# Patient Record
Sex: FEMALE | Race: WHITE | Employment: FULL TIME | URBAN - METROPOLITAN AREA
[De-identification: names, ages, dates, MRNs, and addresses within clinical notes are randomized per-mention and may not be internally consistent; named-entity substitution may affect disease eponyms.]

---

## 2017-03-02 NOTE — TELEPHONE ENCOUNTER
From: Flori Hyatt  To:  Shin Lujan MD  Sent: 3/2/2017 12:46 PM CST  Subject: Medication Renewal Request    Original authorizing provider: MD Flori Ware would like a refill of the following medications:  Nitrofurantoin Macrocrystal

## 2017-03-02 NOTE — TELEPHONE ENCOUNTER
Dr. Yoan Taylor, Pt [facility] 3/7/16 and pt does not have upcoming sandie. Pt contacted us through my chart for a refill on nitrofuratoin 50mg if you agree please review and sign med thank you. I copied and pasted part of your note below. ..     Reviewed findings with behzad

## 2017-03-03 RX ORDER — NITROFURANTOIN MACROCRYSTALS 50 MG/1
50 CAPSULE ORAL NIGHTLY
Qty: 30 CAPSULE | Refills: 11 | Status: SHIPPED | OUTPATIENT
Start: 2017-03-03 | End: 2018-05-23

## 2017-05-24 DIAGNOSIS — Z30.41 ORAL CONTRACEPTIVE USE: ICD-10-CM

## 2017-05-26 DIAGNOSIS — Z30.41 ORAL CONTRACEPTIVE USE: ICD-10-CM

## 2017-05-29 RX ORDER — NORGESTIMATE AND ETHINYL ESTRADIOL 7DAYSX3 28
KIT ORAL
Qty: 1 PACKAGE | Refills: 1 | Status: SHIPPED | OUTPATIENT
Start: 2017-05-29 | End: 2017-07-27

## 2017-05-29 NOTE — TELEPHONE ENCOUNTER
Gynecology Medications  Protocol Criteria:  · Appointment scheduled in the past 12 months or the next 3 months  · Pap smear in the past 12 months  · Pap smear WNL manually verified  Recent Visits       Provider Department Primary Dx    11 months ago [employer]

## 2017-05-29 NOTE — TELEPHONE ENCOUNTER
From: Ajay Mix  To: Darren Jackson MD  Sent: 5/24/2017 12:35 PM CDT  Subject: Medication Renewal Request    Original authorizing provider: MD Ajay Zelaya would like a refill of the following medications:  Zenaida Rodriguez

## 2017-05-30 RX ORDER — NORGESTIMATE AND ETHINYL ESTRADIOL 7DAYSX3 28
KIT ORAL
Qty: 1 PACKAGE | Refills: 0 | Status: SHIPPED | OUTPATIENT
Start: 2017-05-30 | End: 2017-11-07

## 2017-05-30 NOTE — TELEPHONE ENCOUNTER
From: Francis Herrmann  To: Jeffrey Vizcarra MD  Sent: 5/26/2017 11:21 AM CDT  Subject: Medication Renewal Request    Original authorizing provider: MD Francis Underwood would like a refill of the following medications:  Teena Lama

## 2017-07-26 DIAGNOSIS — Z30.41 ORAL CONTRACEPTIVE USE: ICD-10-CM

## 2017-07-26 RX ORDER — NORGESTIMATE AND ETHINYL ESTRADIOL 7DAYSX3 28
KIT ORAL
Qty: 1 PACKAGE | Refills: 1 | Status: CANCELLED | OUTPATIENT
Start: 2017-07-26

## 2017-07-26 NOTE — TELEPHONE ENCOUNTER
Refill Protocol Appointment Criteria  · Appointment scheduled in the past 6 months or in the next 3 months  Recent Outpatient Visits            1 year ago Routine health maintenance    [address] [place] [address] [place], [address] [place] [address], Baljeet Richmond MD    Office Visit

## 2017-07-26 NOTE — TELEPHONE ENCOUNTER
Pt called in requesting a refill on her birth control please. Pt has a scheduled px with Dr. Rubi Dubois already for 8/21  Pt also states she is out of the medication (verified new pharmacy on file as well).     Current Outpatient Prescriptions:   •  Norgestim-Eth

## 2017-07-27 DIAGNOSIS — Z30.41 ORAL CONTRACEPTIVE USE: ICD-10-CM

## 2017-07-30 RX ORDER — NORGESTIMATE AND ETHINYL ESTRADIOL 7DAYSX3 28
KIT ORAL
Qty: 28 TABLET | Refills: 1 | Status: SHIPPED | OUTPATIENT
Start: 2017-07-30 | End: 2017-09-25

## 2017-08-30 DIAGNOSIS — Z30.41 ORAL CONTRACEPTIVE USE: ICD-10-CM

## 2017-08-31 RX ORDER — NORGESTIMATE AND ETHINYL ESTRADIOL 7DAYSX3 28
KIT ORAL
Qty: 28 TABLET | Refills: 1
Start: 2017-08-31

## 2017-08-31 NOTE — TELEPHONE ENCOUNTER
From: Jeannine Robles  Sent: 8/30/2017 3:29 PM CDT  Subject: Medication Renewal Request    [id number] Garvin would like a refill of the following medications:  Mike Terry Triphasic 0.18/0.215/0.25 MG-35 MCG Oral Tab Mackenzie Chisholm MD]    Preferred phar

## 2017-09-25 DIAGNOSIS — Z30.41 ORAL CONTRACEPTIVE USE: ICD-10-CM

## 2017-09-25 RX ORDER — NORGESTIMATE AND ETHINYL ESTRADIOL 7DAYSX3 28
KIT ORAL
Qty: 28 TABLET | Refills: 1 | Status: SHIPPED | OUTPATIENT
Start: 2017-09-25 | End: 2017-11-07

## 2017-09-25 NOTE — TELEPHONE ENCOUNTER
Pt is requesting refill.     :  Norgestim-Eth Estrad Triphasic 0.18/0.215/0.25 MG-35 MCG Oral Tab PLEASE SCHEDULE AN APPOINTMENT. 1 daily.  Disp: 28 tablet Rfl: 1

## 2017-11-07 ENCOUNTER — LAB ENCOUNTER (OUTPATIENT)
Dept: LAB | Facility: HOSPITAL | Age: 27
End: 2017-11-07
Attending: INTERNAL MEDICINE
Payer: COMMERCIAL

## 2017-11-07 ENCOUNTER — OFFICE VISIT (OUTPATIENT)
Dept: INTERNAL MEDICINE CLINIC | Facility: CLINIC | Age: 27
End: 2017-11-07

## 2017-11-07 VITALS
HEART RATE: 75 BPM | DIASTOLIC BLOOD PRESSURE: 63 MMHG | BODY MASS INDEX: 19.96 KG/M2 | WEIGHT: 127.19 LBS | SYSTOLIC BLOOD PRESSURE: 97 MMHG | HEIGHT: 67 IN | RESPIRATION RATE: 18 BRPM

## 2017-11-07 DIAGNOSIS — Z00.00 ROUTINE HEALTH MAINTENANCE: ICD-10-CM

## 2017-11-07 DIAGNOSIS — D22.9 NUMEROUS MOLES: ICD-10-CM

## 2017-11-07 DIAGNOSIS — Z30.41 ORAL CONTRACEPTIVE USE: ICD-10-CM

## 2017-11-07 DIAGNOSIS — Z00.00 ROUTINE HEALTH MAINTENANCE: Primary | ICD-10-CM

## 2017-11-07 DIAGNOSIS — B07.0 PLANTAR WART: ICD-10-CM

## 2017-11-07 PROCEDURE — 36415 COLL VENOUS BLD VENIPUNCTURE: CPT

## 2017-11-07 PROCEDURE — 80048 BASIC METABOLIC PNL TOTAL CA: CPT

## 2017-11-07 PROCEDURE — 85025 COMPLETE CBC W/AUTO DIFF WBC: CPT

## 2017-11-07 PROCEDURE — 84443 ASSAY THYROID STIM HORMONE: CPT

## 2017-11-07 PROCEDURE — 99395 PREV VISIT EST AGE 18-39: CPT | Performed by: INTERNAL MEDICINE

## 2017-11-07 RX ORDER — NORGESTIMATE AND ETHINYL ESTRADIOL 7DAYSX3 28
KIT ORAL
Qty: 3 PACKAGE | Refills: 11 | Status: SHIPPED | OUTPATIENT
Start: 2017-11-07 | End: 2019-01-10

## 2017-11-07 RX ORDER — CITALOPRAM 20 MG/1
20 TABLET ORAL DAILY
Refills: 0 | COMMUNITY
Start: 2017-08-30 | End: 2019-02-04

## 2017-11-07 NOTE — PROGRESS NOTES
HPI:    Patient ID: Jeannine Robles is a 32year old female. Pt is here for a physical.    Pt has a lump on her back for the past 5 years. She has warts on her right foot for more than 10 years and she can't get rid of them.   She also has moles and she wo of Onset   • Thyroid disease Mother      per NG: Hypothyroidism    • Diabetes Neg    • Heart Attack Neg      per NG:  Sudden death/MI under age 54       . BP 97/63 (BP Location: Right arm, Patient Position: Sitting, Cuff Size: adult)   Pulse 75   Resp 18 warts on right foot. Psychiatric: She has a normal mood and affect.  Thought content normal.              ASSESSMENT/PLAN:     Problem List Items Addressed This Visit     Routine health maintenance - Primary    Relevant Orders    CBC WITH DIFFERENTIAL WIT

## 2018-01-04 ENCOUNTER — NURSE TRIAGE (OUTPATIENT)
Dept: OTHER | Age: 28
End: 2018-01-04

## 2018-01-04 ENCOUNTER — TELEPHONE (OUTPATIENT)
Dept: INTERNAL MEDICINE CLINIC | Facility: CLINIC | Age: 28
End: 2018-01-04

## 2018-01-04 DIAGNOSIS — R30.0 DYSURIA: Primary | ICD-10-CM

## 2018-01-04 NOTE — TELEPHONE ENCOUNTER
Action Requested: Summary for Provider     []  Critical Lab, Recommendations Needed  [x] Need Additional Advice  []   FYI    []   Need Orders  [] Need Medications Sent to Pharmacy  []  Other     SUMMARY: pt advised to seek IC no access w/OV    Pt called in

## 2018-01-04 NOTE — TELEPHONE ENCOUNTER
Correction   -     . Fernando Sullivan 10  Glasses   Per  Day . Fernando Sullivan .Might  Need to be seen by Urologist  or other available Doctors . Fernando Sullivan

## 2018-01-04 NOTE — TELEPHONE ENCOUNTER
Please advised patient to increase fluids water intake and glasses a day at least    Intake cranberry capsules twice daily over-the-counter for 2-3 weeks    Patient should be taking Macrobid 100 mg twice daily for a week    Patient should inform urologist

## 2018-05-21 RX ORDER — NITROFURANTOIN MACROCRYSTALS 50 MG/1
CAPSULE ORAL
Qty: 30 CAPSULE | Refills: 10 | OUTPATIENT
Start: 2018-05-21

## 2018-05-21 NOTE — TELEPHONE ENCOUNTER
Pt LOV with Dr. Anshul De La Vega was 3/7/16 pt has no upcoming ap. It has been 2 years since last seen pt needs appointment refill denied.     ASSESSMENT/PLAN:   Assessment  Urinary tract infection, site not specified  (primary encounter diagnosis)     Reviewed findi

## 2018-05-23 ENCOUNTER — OFFICE VISIT (OUTPATIENT)
Dept: SURGERY | Facility: CLINIC | Age: 28
End: 2018-05-23

## 2018-05-23 VITALS
BODY MASS INDEX: 19.62 KG/M2 | DIASTOLIC BLOOD PRESSURE: 63 MMHG | HEIGHT: 67 IN | HEART RATE: 91 BPM | SYSTOLIC BLOOD PRESSURE: 98 MMHG | WEIGHT: 125 LBS

## 2018-05-23 DIAGNOSIS — N39.0 RECURRENT UTI: Primary | ICD-10-CM

## 2018-05-23 PROCEDURE — 99213 OFFICE O/P EST LOW 20 MIN: CPT | Performed by: UROLOGY

## 2018-05-23 PROCEDURE — 99212 OFFICE O/P EST SF 10 MIN: CPT | Performed by: UROLOGY

## 2018-05-23 RX ORDER — NITROFURANTOIN MACROCRYSTALS 50 MG/1
50 CAPSULE ORAL NIGHTLY
Qty: 30 CAPSULE | Refills: 11 | Status: SHIPPED | OUTPATIENT
Start: 2018-05-23 | End: 2019-07-23

## 2018-05-23 NOTE — PROGRESS NOTES
Radha Mo is a 32year old female. HPI:   Patient presents with:   Follow - Up: patient presents for f/u on hx recurrent uti is on macrobid 50mg after intercourse to prevent uti and feels this has helped       51-year-old female with sexual intercours Refilled her prescription for nitrofurantoin which she takes only when she has intercourse. We agreed that she would follow-up every 2 years. Orders This Visit:  No orders of the defined types were placed in this encounter.       Meds This Visit:

## 2018-07-23 ENCOUNTER — HOSPITAL (OUTPATIENT)
Dept: OTHER | Age: 28
End: 2018-07-23
Attending: NURSE PRACTITIONER

## 2018-08-01 ENCOUNTER — HOSPITAL (OUTPATIENT)
Dept: OTHER | Age: 28
End: 2018-08-01
Attending: ORTHOPAEDIC SURGERY

## 2019-01-10 DIAGNOSIS — Z30.41 ORAL CONTRACEPTIVE USE: ICD-10-CM

## 2019-01-10 RX ORDER — NORGESTIMATE AND ETHINYL ESTRADIOL 7DAYSX3 28
KIT ORAL
Qty: 29 TABLET | Refills: 0 | Status: SHIPPED | OUTPATIENT
Start: 2019-01-10 | End: 2019-02-04

## 2019-01-11 NOTE — TELEPHONE ENCOUNTER
Appt request sent via [employer] please f/u, thanks    Please review; protocol failed.   Gynecology Medications  Protocol Criteria:  · Appointment scheduled in the past 12 months or the next 3 months  · Pap smear in the past 12 months  · Pap smear WNL man

## 2019-02-04 ENCOUNTER — OFFICE VISIT (OUTPATIENT)
Dept: INTERNAL MEDICINE CLINIC | Facility: CLINIC | Age: 29
End: 2019-02-04
Payer: COMMERCIAL

## 2019-02-04 VITALS
HEIGHT: 67 IN | HEART RATE: 102 BPM | WEIGHT: 127 LBS | DIASTOLIC BLOOD PRESSURE: 70 MMHG | BODY MASS INDEX: 19.93 KG/M2 | SYSTOLIC BLOOD PRESSURE: 108 MMHG

## 2019-02-04 DIAGNOSIS — Z12.83 SKIN EXAM, SCREENING FOR CANCER: Primary | ICD-10-CM

## 2019-02-04 DIAGNOSIS — Z30.41 ORAL CONTRACEPTIVE USE: ICD-10-CM

## 2019-02-04 PROCEDURE — 99213 OFFICE O/P EST LOW 20 MIN: CPT | Performed by: INTERNAL MEDICINE

## 2019-02-04 PROCEDURE — 99212 OFFICE O/P EST SF 10 MIN: CPT | Performed by: INTERNAL MEDICINE

## 2019-02-04 RX ORDER — ESCITALOPRAM OXALATE 10 MG/1
15 TABLET ORAL DAILY
Qty: 30 TABLET | Refills: 2 | COMMUNITY
Start: 2019-02-04 | End: 2019-10-14 | Stop reason: ALTCHOICE

## 2019-02-04 RX ORDER — NORGESTIMATE AND ETHINYL ESTRADIOL 7DAYSX3 28
1 KIT ORAL
Qty: 3 PACKAGE | Refills: 3 | Status: SHIPPED | OUTPATIENT
Start: 2019-02-04 | End: 2019-10-14

## 2019-02-05 NOTE — PROGRESS NOTES
HPI:    Patient ID: Gene Marvin is a 29year old female. Pt is taking the OCP without a problem. Pt is seeing seeing a therapist for anxiety. It is controlled with therapy and Lexapro.   She would like to see the dermatologist because she has some mol Sitting, Cuff Size: adult)   Pulse 102   Ht 5' 7\" (1.702 m)   Wt 127 lb (57.6 kg)   LMP 01/30/2019 (Exact Date)   BMI 19.89 kg/m²   PHYSICAL EXAM:   Physical Exam   Nursing note and vitals reviewed.    Constitutional: She is oriented to person, place, and

## 2019-07-25 NOTE — TELEPHONE ENCOUNTER
Pharmacy states pt is requesting a refill of Nitrofurantoin Macrocrystal 50 MG Oral Cap.  pls advise thank you

## 2019-07-27 RX ORDER — NITROFURANTOIN MACROCRYSTALS 50 MG/1
CAPSULE ORAL
Qty: 30 CAPSULE | Refills: 10 | Status: SHIPPED | OUTPATIENT
Start: 2019-07-27 | End: 2020-11-18

## 2019-10-14 ENCOUNTER — OFFICE VISIT (OUTPATIENT)
Dept: INTERNAL MEDICINE CLINIC | Facility: CLINIC | Age: 29
End: 2019-10-14
Payer: COMMERCIAL

## 2019-10-14 VITALS
SYSTOLIC BLOOD PRESSURE: 120 MMHG | HEART RATE: 101 BPM | HEIGHT: 67 IN | DIASTOLIC BLOOD PRESSURE: 68 MMHG | BODY MASS INDEX: 20.33 KG/M2 | WEIGHT: 129.5 LBS | TEMPERATURE: 99 F

## 2019-10-14 DIAGNOSIS — Z30.41 ORAL CONTRACEPTIVE USE: ICD-10-CM

## 2019-10-14 DIAGNOSIS — Z00.00 ROUTINE HEALTH MAINTENANCE: Primary | ICD-10-CM

## 2019-10-14 PROCEDURE — 99395 PREV VISIT EST AGE 18-39: CPT | Performed by: INTERNAL MEDICINE

## 2019-10-14 RX ORDER — NORGESTIMATE AND ETHINYL ESTRADIOL 7DAYSX3 28
1 KIT ORAL
Qty: 3 PACKAGE | Refills: 3 | Status: SHIPPED | OUTPATIENT
Start: 2019-10-14 | End: 2020-11-28

## 2019-10-14 RX ORDER — VENLAFAXINE HYDROCHLORIDE 150 MG/1
150 TABLET, EXTENDED RELEASE ORAL DAILY
COMMUNITY
End: 2020-11-18

## 2019-10-14 NOTE — PROGRESS NOTES
HPI:    Patient ID: Lola Harrison is a 29year old female. Pt is here for a physical.    Her right ear feels plugged. She sees a psychiatrist and therapist for her anxiety and depression. Her symptoms are controlled.       Review of Systems   Constituti Mother         per NG: Hypothyroidism    • Diabetes Neg    • Heart Attack Neg         per NG:  Sudden death/MI under age 54       . /68 (BP Location: Right arm, Patient Position: Sitting, Cuff Size: adult)   Pulse 101   Temp 98.5 °F (36.9 °C) (Oral) normal.              ASSESSMENT/PLAN:     Problem List Items Addressed This Visit        High    Routine health maintenance - Primary     Unremarkable exam.  Counseled regarding exercise and healthy diet. PAP was done today.          Relevant Orders    THI

## 2020-10-30 RX ORDER — NITROFURANTOIN MACROCRYSTALS 50 MG/1
CAPSULE ORAL
Qty: 30 CAPSULE | Refills: 0 | OUTPATIENT
Start: 2020-10-30

## 2020-11-18 ENCOUNTER — OFFICE VISIT (OUTPATIENT)
Dept: SURGERY | Facility: CLINIC | Age: 30
End: 2020-11-18
Payer: COMMERCIAL

## 2020-11-18 VITALS
BODY MASS INDEX: 19.62 KG/M2 | HEIGHT: 67 IN | DIASTOLIC BLOOD PRESSURE: 67 MMHG | WEIGHT: 125 LBS | SYSTOLIC BLOOD PRESSURE: 97 MMHG | HEART RATE: 80 BPM

## 2020-11-18 DIAGNOSIS — N39.0 RECURRENT UTI: Primary | ICD-10-CM

## 2020-11-18 PROCEDURE — 3008F BODY MASS INDEX DOCD: CPT | Performed by: NURSE PRACTITIONER

## 2020-11-18 PROCEDURE — 3074F SYST BP LT 130 MM HG: CPT | Performed by: NURSE PRACTITIONER

## 2020-11-18 PROCEDURE — 99072 ADDL SUPL MATRL&STAF TM PHE: CPT | Performed by: NURSE PRACTITIONER

## 2020-11-18 PROCEDURE — 99212 OFFICE O/P EST SF 10 MIN: CPT | Performed by: NURSE PRACTITIONER

## 2020-11-18 PROCEDURE — 3078F DIAST BP <80 MM HG: CPT | Performed by: NURSE PRACTITIONER

## 2020-11-18 RX ORDER — NITROFURANTOIN MACROCRYSTALS 50 MG/1
50 CAPSULE ORAL DAILY PRN
Qty: 30 CAPSULE | Refills: 5 | Status: SHIPPED | OUTPATIENT
Start: 2020-11-18 | End: 2021-04-15

## 2020-11-18 NOTE — PROGRESS NOTES
HPI:    Patient ID: Lola Harrison is a 27year old female. HPI     Patient is a 27year old female who presents to the clinic for a follow up. Last seen by Dr. Jose J Armenta 5/23/18. Currently on nitrofurantoin 50 mg PO post coital for UTI prevention.   She Normal range of motion. Neurological: She is alert and oriented to person, place, and time. Skin: Skin is warm and dry. Psychiatric: She has a normal mood and affect.             ASSESSMENT/PLAN:   Recurrent uti  (primary encounter diagnosis)      Lina Mckeon

## 2020-11-28 DIAGNOSIS — Z30.41 ORAL CONTRACEPTIVE USE: ICD-10-CM

## 2020-11-28 RX ORDER — NORGESTIMATE AND ETHINYL ESTRADIOL 7DAYSX3 28
KIT ORAL
Qty: 84 TABLET | Refills: 0 | Status: SHIPPED | OUTPATIENT
Start: 2020-11-28 | End: 2021-02-03

## 2021-01-14 ENCOUNTER — PATIENT MESSAGE (OUTPATIENT)
Dept: INTERNAL MEDICINE CLINIC | Facility: CLINIC | Age: 31
End: 2021-01-14

## 2021-01-14 ENCOUNTER — NURSE TRIAGE (OUTPATIENT)
Dept: INTERNAL MEDICINE CLINIC | Facility: CLINIC | Age: 31
End: 2021-01-14

## 2021-01-14 NOTE — TELEPHONE ENCOUNTER
Action Requested: Summary for Provider     []  Critical Lab, Recommendations Needed  [] Need Additional Advice  []   FYI    []   Need Orders  [] Need Medications Sent to Pharmacy  []  Other     SUMMARY: Per protocol advised office visit, but patient Kristin Maloney

## 2021-01-14 NOTE — TELEPHONE ENCOUNTER
Please call patient and triage regarding MyChart message copied here. Context [employer]hart message response sent in original MyChart encounter, per department process. ----- Message from Calvin Shannon sent at 1/14/2021  2:42 PM CST -----  Regarding: Non-Urgent Medical

## 2021-01-14 NOTE — TELEPHONE ENCOUNTER
From: Harpreet Jose  To: Chyna Arteaga MD  Sent: 1/14/2021 2:42 PM CST  Subject: Non-Urgent Medical Question    I seem to have what I believe is a Bartholin Cyst on one side of my vulva, that has persisted since Saturday 1/8.  Some slight soreness with prol

## 2021-01-21 NOTE — TELEPHONE ENCOUNTER
Verified name and . Patient reports that the the small cyst that she called triage for as seen in previous charting note has not improved after following triage advice. She states that there are no signs of infection and no drainage at this time.  Appo

## 2021-01-23 ENCOUNTER — OFFICE VISIT (OUTPATIENT)
Dept: INTERNAL MEDICINE CLINIC | Facility: CLINIC | Age: 31
End: 2021-01-23
Payer: COMMERCIAL

## 2021-01-23 VITALS
BODY MASS INDEX: 18.68 KG/M2 | DIASTOLIC BLOOD PRESSURE: 67 MMHG | SYSTOLIC BLOOD PRESSURE: 105 MMHG | WEIGHT: 119 LBS | HEART RATE: 87 BPM | HEIGHT: 67 IN | RESPIRATION RATE: 16 BRPM

## 2021-01-23 DIAGNOSIS — N75.0 BARTHOLIN GLAND CYST: Primary | ICD-10-CM

## 2021-01-23 PROCEDURE — 3074F SYST BP LT 130 MM HG: CPT | Performed by: INTERNAL MEDICINE

## 2021-01-23 PROCEDURE — 3008F BODY MASS INDEX DOCD: CPT | Performed by: INTERNAL MEDICINE

## 2021-01-23 PROCEDURE — 3078F DIAST BP <80 MM HG: CPT | Performed by: INTERNAL MEDICINE

## 2021-01-23 PROCEDURE — 99213 OFFICE O/P EST LOW 20 MIN: CPT | Performed by: INTERNAL MEDICINE

## 2021-01-23 NOTE — PROGRESS NOTES
HPI:    Patient ID: Farley Runner is a 27year old female. Pt c/o vaginal lump for the past 2 weeks. It is tender. It has decreased a bit. No fever or drainage. She tried warm baths. No sexual activity since October.       Past Surgical History:   Pr Neurological: She is alert and oriented to person, place, and time. ASSESSMENT/PLAN:     Problem List Items Addressed This Visit        Unprioritized    Bartholin gland cyst - Primary     This was worse and is much improved per pt.   Advised co

## 2021-01-23 NOTE — PATIENT INSTRUCTIONS
Understanding Bartholin Cyst and Abscess     A woman has two Bartholin glands. They are located on the sides of the vaginal opening. These pea-sized glands make mucus. This mucus lubricates the outside part of the vagina (vulva).  If a tube (duct) in one · Redness, swelling, or fluid leaking from the cyst that gets worse  · Pain that gets worse  · Symptoms that don’t get better, or get worse  · New symptoms  Georgina last reviewed this educational content on 12/1/2019  © [id number] [employer]

## 2021-01-23 NOTE — ASSESSMENT & PLAN NOTE
This was worse and is much improved per pt. Advised continue sitz baths as needed. If worse again, advised to see vania Malone written information given to pt.

## 2021-02-02 DIAGNOSIS — Z30.41 ORAL CONTRACEPTIVE USE: ICD-10-CM

## 2021-02-03 RX ORDER — NORGESTIMATE AND ETHINYL ESTRADIOL 7DAYSX3 28
KIT ORAL
Qty: 84 TABLET | Refills: 0 | Status: SHIPPED | OUTPATIENT
Start: 2021-02-03 | End: 2021-04-30

## 2021-04-15 ENCOUNTER — OFFICE VISIT (OUTPATIENT)
Dept: OBGYN CLINIC | Facility: CLINIC | Age: 31
End: 2021-04-15
Payer: COMMERCIAL

## 2021-04-15 VITALS
BODY MASS INDEX: 19.78 KG/M2 | DIASTOLIC BLOOD PRESSURE: 70 MMHG | HEART RATE: 90 BPM | WEIGHT: 126 LBS | HEIGHT: 67 IN | SYSTOLIC BLOOD PRESSURE: 111 MMHG

## 2021-04-15 DIAGNOSIS — N75.0 BARTHOLIN CYST: Primary | ICD-10-CM

## 2021-04-15 PROBLEM — Z00.00 ROUTINE HEALTH MAINTENANCE: Status: RESOLVED | Noted: 2017-11-07 | Resolved: 2021-04-15

## 2021-04-15 PROCEDURE — 3008F BODY MASS INDEX DOCD: CPT | Performed by: OBSTETRICS & GYNECOLOGY

## 2021-04-15 PROCEDURE — 3074F SYST BP LT 130 MM HG: CPT | Performed by: OBSTETRICS & GYNECOLOGY

## 2021-04-15 PROCEDURE — 99202 OFFICE O/P NEW SF 15 MIN: CPT | Performed by: OBSTETRICS & GYNECOLOGY

## 2021-04-15 PROCEDURE — 3078F DIAST BP <80 MM HG: CPT | Performed by: OBSTETRICS & GYNECOLOGY

## 2021-04-15 NOTE — PROGRESS NOTES
Angelic Green is a 27year old female No obstetric history on file. Patient's last menstrual period was 04/06/2021. Patient presents with:  Gyn Problem: possible cyst left side of vulva    New pt. Noticed a lump in left vulva in last 2 months. No pain.

## 2021-04-30 DIAGNOSIS — Z30.41 ORAL CONTRACEPTIVE USE: ICD-10-CM

## 2021-04-30 RX ORDER — NORGESTIMATE AND ETHINYL ESTRADIOL 7DAYSX3 28
KIT ORAL
Qty: 84 TABLET | Refills: 0 | Status: SHIPPED | OUTPATIENT
Start: 2021-04-30 | End: 2021-05-10

## 2021-05-10 ENCOUNTER — OFFICE VISIT (OUTPATIENT)
Dept: INTERNAL MEDICINE CLINIC | Facility: CLINIC | Age: 31
End: 2021-05-10
Payer: COMMERCIAL

## 2021-05-10 VITALS
RESPIRATION RATE: 20 BRPM | DIASTOLIC BLOOD PRESSURE: 66 MMHG | HEIGHT: 67 IN | BODY MASS INDEX: 19.35 KG/M2 | WEIGHT: 123.31 LBS | SYSTOLIC BLOOD PRESSURE: 102 MMHG | HEART RATE: 112 BPM

## 2021-05-10 DIAGNOSIS — Z00.00 ROUTINE HEALTH MAINTENANCE: Primary | ICD-10-CM

## 2021-05-10 DIAGNOSIS — Z30.41 ORAL CONTRACEPTIVE USE: ICD-10-CM

## 2021-05-10 PROCEDURE — 99395 PREV VISIT EST AGE 18-39: CPT | Performed by: INTERNAL MEDICINE

## 2021-05-10 PROCEDURE — 3078F DIAST BP <80 MM HG: CPT | Performed by: INTERNAL MEDICINE

## 2021-05-10 PROCEDURE — 3008F BODY MASS INDEX DOCD: CPT | Performed by: INTERNAL MEDICINE

## 2021-05-10 PROCEDURE — 3074F SYST BP LT 130 MM HG: CPT | Performed by: INTERNAL MEDICINE

## 2021-05-10 RX ORDER — NORGESTIMATE AND ETHINYL ESTRADIOL 7DAYSX3 28
1 KIT ORAL DAILY
Qty: 84 TABLET | Refills: 3 | Status: SHIPPED | OUTPATIENT
Start: 2021-05-10

## 2021-05-10 NOTE — PROGRESS NOTES
HPI:    Patient ID: Edinson Marshall is a 27year old female. HPI: Pt is here for a physical.  She has a painless lump on her lower back that has been there for 7 years and has not changed.     Past Surgical History:   Procedure Laterality Date   • OTHER Lef patient is not nervous/anxious.          ./66 (BP Location: Left arm, Patient Position: Sitting, Cuff Size: adult)   Pulse 112   Resp 20   Ht 5' 7\" (1.702 m)   Wt 123 lb 4.8 oz (55.9 kg)   LMP 04/06/2021   Breastfeeding No   BMI 19.31 kg/m²   PHYSICA deficit. Deep Tendon Reflexes: Reflexes are normal and symmetric. Psychiatric:         Behavior: Behavior normal.         Thought Content:  Thought content normal.         Judgment: Judgment normal.                 ASSESSMENT/PLAN:     Problem List I

## 2021-07-29 ENCOUNTER — OFFICE VISIT (OUTPATIENT)
Dept: DERMATOLOGY CLINIC | Facility: CLINIC | Age: 31
End: 2021-07-29
Payer: COMMERCIAL

## 2021-07-29 DIAGNOSIS — D23.60 BENIGN NEOPLASM OF SKIN OF UPPER LIMB, INCLUDING SHOULDER, UNSPECIFIED LATERALITY: ICD-10-CM

## 2021-07-29 DIAGNOSIS — D23.4 BENIGN NEOPLASM OF SCALP AND SKIN OF NECK: ICD-10-CM

## 2021-07-29 DIAGNOSIS — D22.9 MULTIPLE NEVI: Primary | ICD-10-CM

## 2021-07-29 DIAGNOSIS — D23.70 BENIGN NEOPLASM OF SKIN OF LOWER LIMB, INCLUDING HIP, UNSPECIFIED LATERALITY: ICD-10-CM

## 2021-07-29 DIAGNOSIS — D23.30 BENIGN NEOPLASM OF SKIN OF FACE: ICD-10-CM

## 2021-07-29 DIAGNOSIS — D23.5 BENIGN NEOPLASM OF SKIN OF TRUNK, EXCEPT SCROTUM: ICD-10-CM

## 2021-07-29 DIAGNOSIS — L72.0 EPIDERMAL CYST: ICD-10-CM

## 2021-07-29 PROCEDURE — 99203 OFFICE O/P NEW LOW 30 MIN: CPT | Performed by: DERMATOLOGY

## 2021-08-08 NOTE — PROGRESS NOTES
Michelle Rivas is a 27year old female. Patient presents with:  Derm Problem: New patient presents for full body check. Generalized multi moles and burns easily.  No personal or family hx of skin ca             Patient has no allergy information on recor Not on file    Other Topics      Concerns:         Service: Not Asked        Blood Transfusions: Not Asked        Caffeine Concern: Yes          per NG:  Soda, 1 per week        Occupational Exposure: Not Asked        Hobby Hazards: Not Asked Generalized multi moles and burns easily. No personal or family hx of skin ca     The patient concern with multiple skin lesions. Burns easily.  Unaware of any family history of skin cancer no personal history of skin cancer    Patient presents with concern carefully. Multiple benign-appearing pigmented lesions over the trunk, macule 8 mm left medial ankle observation. No atypical appearing lesions patient with extensive nevi no atypical features.     Patient is outdoors a fair amount play softball encourag results found for this or any previous visit (from the past 50 hour(s)). Meds This Visit:      Imaging Orders:  None     Referral Orders:  No orders of the defined types were placed in this encounter.

## 2022-04-14 ENCOUNTER — LAB ENCOUNTER (OUTPATIENT)
Dept: LAB | Facility: HOSPITAL | Age: 32
End: 2022-04-14
Attending: NURSE PRACTITIONER
Payer: COMMERCIAL

## 2022-04-14 ENCOUNTER — TELEPHONE (OUTPATIENT)
Dept: SURGERY | Facility: CLINIC | Age: 32
End: 2022-04-14

## 2022-04-14 DIAGNOSIS — R30.0 DYSURIA: ICD-10-CM

## 2022-04-14 LAB
BILIRUB UR QL: NEGATIVE
CLARITY UR: CLEAR
COLOR UR: YELLOW
GLUCOSE UR-MCNC: NEGATIVE MG/DL
KETONES UR-MCNC: NEGATIVE MG/DL
LEUKOCYTE ESTERASE UR QL STRIP.AUTO: NEGATIVE
NITRITE UR QL STRIP.AUTO: POSITIVE
PH UR: 6 [PH] (ref 5–8)
PROT UR-MCNC: NEGATIVE MG/DL
SP GR UR STRIP: 1.02 (ref 1–1.03)
UROBILINOGEN UR STRIP-ACNC: 2
VIT C UR-MCNC: NEGATIVE MG/DL

## 2022-04-14 PROCEDURE — 87086 URINE CULTURE/COLONY COUNT: CPT

## 2022-04-14 PROCEDURE — 81001 URINALYSIS AUTO W/SCOPE: CPT

## 2022-04-14 RX ORDER — CEPHALEXIN 500 MG/1
500 CAPSULE ORAL 2 TIMES DAILY
Qty: 10 CAPSULE | Refills: 0 | Status: SHIPPED | OUTPATIENT
Start: 2022-04-14 | End: 2022-04-19

## 2022-04-14 NOTE — TELEPHONE ENCOUNTER
-S/w pt; identity verified with name & .  -I read message to pt from [facility] as stated below. I reinforced she needed to submit a specimen before starting the ABX.  -Pt established OV with [facility] for 22 @ 2:30pm.  -Encounter complete.

## 2022-04-14 NOTE — TELEPHONE ENCOUNTER
Patient should submit urine for UA and urine culture. After submitting she can start Keflex 500 mg PO BID x 5 days, I sent this to her pharmacy. She is due for a follow up as she was last seen in 2020. I recommend follow up soon in case she has problems in the future.

## 2022-04-14 NOTE — TELEPHONE ENCOUNTER
Spoke with pt. States she is having burning with urination that started yesterday afternoon. Denies cloudiness, denies increased frequency or fevers or flu-like sx. States she took AZO last night to help with burning sensation. States she has nitrofurantoin at home 50 mg but unsure of dosing for UTI. Instructed pt to submit urine specimen for UA/UC. Routed to [facility];  Please advise. Thank you.

## 2022-04-20 RX ORDER — NITROFURANTOIN MACROCRYSTALS 50 MG/1
CAPSULE ORAL
Qty: 30 CAPSULE | Refills: 0 | Status: SHIPPED | OUTPATIENT
Start: 2022-04-20

## 2022-04-28 RX ORDER — BUPROPION HYDROCHLORIDE 150 MG/1
TABLET ORAL
COMMUNITY

## 2022-05-05 ENCOUNTER — OFFICE VISIT (OUTPATIENT)
Dept: SURGERY | Facility: CLINIC | Age: 32
End: 2022-05-05
Payer: COMMERCIAL

## 2022-05-05 DIAGNOSIS — N39.0 RECURRENT UTI: Primary | ICD-10-CM

## 2022-05-05 PROCEDURE — 99213 OFFICE O/P EST LOW 20 MIN: CPT | Performed by: NURSE PRACTITIONER

## 2022-05-05 RX ORDER — NITROFURANTOIN MACROCRYSTALS 50 MG/1
50 CAPSULE ORAL DAILY PRN
Qty: 30 CAPSULE | Refills: 5 | Status: SHIPPED | OUTPATIENT
Start: 2022-05-05

## 2022-05-18 ENCOUNTER — OFFICE VISIT (OUTPATIENT)
Dept: OBGYN CLINIC | Facility: CLINIC | Age: 32
End: 2022-05-18
Payer: COMMERCIAL

## 2022-05-18 VITALS
DIASTOLIC BLOOD PRESSURE: 70 MMHG | SYSTOLIC BLOOD PRESSURE: 102 MMHG | WEIGHT: 125 LBS | BODY MASS INDEX: 20 KG/M2 | HEART RATE: 103 BPM

## 2022-05-18 DIAGNOSIS — N89.8 VAGINAL ITCHING: Primary | ICD-10-CM

## 2022-05-18 PROCEDURE — 99213 OFFICE O/P EST LOW 20 MIN: CPT | Performed by: NURSE PRACTITIONER

## 2022-05-18 PROCEDURE — 3074F SYST BP LT 130 MM HG: CPT | Performed by: NURSE PRACTITIONER

## 2022-05-18 PROCEDURE — 3078F DIAST BP <80 MM HG: CPT | Performed by: NURSE PRACTITIONER

## 2022-06-20 ENCOUNTER — TELEPHONE (OUTPATIENT)
Dept: INTERNAL MEDICINE CLINIC | Facility: CLINIC | Age: 32
End: 2022-06-20

## 2022-06-20 DIAGNOSIS — Z30.41 ORAL CONTRACEPTIVE USE: ICD-10-CM

## 2022-06-20 RX ORDER — NORGESTIMATE AND ETHINYL ESTRADIOL 7DAYSX3 28
KIT ORAL
Qty: 84 TABLET | Refills: 0 | Status: SHIPPED | OUTPATIENT
Start: 2022-06-20

## 2022-06-20 NOTE — TELEPHONE ENCOUNTER
Pt stating she needs a refill on the medication Norgestim-Eth Estrad Triphasic (TRI-LINYAH) 0.18/0.215/0.25 MG-35 MCG Oral Tab . Her next physical appointment is on 8/15 and will need medication before hand.

## 2022-08-15 ENCOUNTER — OFFICE VISIT (OUTPATIENT)
Dept: INTERNAL MEDICINE CLINIC | Facility: CLINIC | Age: 32
End: 2022-08-15
Payer: COMMERCIAL

## 2022-08-15 VITALS
HEIGHT: 67 IN | WEIGHT: 122 LBS | DIASTOLIC BLOOD PRESSURE: 68 MMHG | HEART RATE: 91 BPM | BODY MASS INDEX: 19.15 KG/M2 | SYSTOLIC BLOOD PRESSURE: 113 MMHG

## 2022-08-15 DIAGNOSIS — F32.A ANXIETY AND DEPRESSION: ICD-10-CM

## 2022-08-15 DIAGNOSIS — Z00.00 ROUTINE HEALTH MAINTENANCE: Primary | ICD-10-CM

## 2022-08-15 DIAGNOSIS — F41.9 ANXIETY AND DEPRESSION: ICD-10-CM

## 2022-08-15 DIAGNOSIS — Z30.41 ORAL CONTRACEPTIVE USE: ICD-10-CM

## 2022-08-15 PROCEDURE — 99395 PREV VISIT EST AGE 18-39: CPT | Performed by: INTERNAL MEDICINE

## 2022-08-15 PROCEDURE — 3008F BODY MASS INDEX DOCD: CPT | Performed by: INTERNAL MEDICINE

## 2022-08-15 PROCEDURE — 3074F SYST BP LT 130 MM HG: CPT | Performed by: INTERNAL MEDICINE

## 2022-08-15 PROCEDURE — 3078F DIAST BP <80 MM HG: CPT | Performed by: INTERNAL MEDICINE

## 2022-08-15 RX ORDER — NORGESTIMATE AND ETHINYL ESTRADIOL 7DAYSX3 28
1 KIT ORAL DAILY
Qty: 84 TABLET | Refills: 3 | Status: SHIPPED | OUTPATIENT
Start: 2022-08-15

## 2022-08-15 RX ORDER — DEXTROAMPHETAMINE SACCHARATE, AMPHETAMINE ASPARTATE MONOHYDRATE, DEXTROAMPHETAMINE SULFATE AND AMPHETAMINE SULFATE 3.75; 3.75; 3.75; 3.75 MG/1; MG/1; MG/1; MG/1
15 CAPSULE, EXTENDED RELEASE ORAL EVERY MORNING
COMMUNITY
Start: 2022-06-30

## 2022-10-08 ENCOUNTER — TELEPHONE (OUTPATIENT)
Dept: INTERNAL MEDICINE CLINIC | Facility: CLINIC | Age: 32
End: 2022-10-08

## 2022-10-08 DIAGNOSIS — R35.0 FREQUENT URINATION: Primary | ICD-10-CM

## 2022-10-08 DIAGNOSIS — R30.0 BURNING WITH URINATION: ICD-10-CM

## 2022-10-08 PROCEDURE — 99441 PHONE E/M BY PHYS 5-10 MIN: CPT | Performed by: INTERNAL MEDICINE

## 2022-12-11 ENCOUNTER — PATIENT MESSAGE (OUTPATIENT)
Dept: OBGYN CLINIC | Facility: CLINIC | Age: 32
End: 2022-12-11

## 2022-12-12 ENCOUNTER — PATIENT MESSAGE (OUTPATIENT)
Dept: INTERNAL MEDICINE CLINIC | Facility: CLINIC | Age: 32
End: 2022-12-12

## 2022-12-12 NOTE — TELEPHONE ENCOUNTER
From: Neris Fitzgerald  To: DANTE Goldberg  Sent: 12/11/2022 11:44 PM CST  Subject: Bartholin cyst + fever    I've had a bartholin cyst on my left labia for a couple years that occasionally swells but usually stays a manageable size. Within the past few days it has swollen to be as large as its been (about an inch-inch and a half), and is mildly sore. There's no abscess, or unusual redness from what I can tell. In the past few days, I've also developed flu-like symptoms, with a small fever starting this evening (99.5), and the symptoms are gradually getting worse. I'm not sure if this could be related to a possible cyst infection, or if I should seek care immediately or wait a while.

## 2022-12-13 ENCOUNTER — NURSE TRIAGE (OUTPATIENT)
Dept: INTERNAL MEDICINE CLINIC | Facility: CLINIC | Age: 32
End: 2022-12-13

## 2022-12-14 ENCOUNTER — OFFICE VISIT (OUTPATIENT)
Dept: INTERNAL MEDICINE CLINIC | Facility: CLINIC | Age: 32
End: 2022-12-14
Payer: COMMERCIAL

## 2022-12-14 VITALS
BODY MASS INDEX: 18.83 KG/M2 | HEART RATE: 67 BPM | HEIGHT: 67 IN | DIASTOLIC BLOOD PRESSURE: 66 MMHG | WEIGHT: 120 LBS | SYSTOLIC BLOOD PRESSURE: 98 MMHG

## 2022-12-14 DIAGNOSIS — H57.12 LEFT EYE PAIN: Primary | ICD-10-CM

## 2022-12-14 PROCEDURE — 3078F DIAST BP <80 MM HG: CPT | Performed by: NURSE PRACTITIONER

## 2022-12-14 PROCEDURE — 99213 OFFICE O/P EST LOW 20 MIN: CPT | Performed by: NURSE PRACTITIONER

## 2022-12-14 PROCEDURE — 3008F BODY MASS INDEX DOCD: CPT | Performed by: NURSE PRACTITIONER

## 2022-12-14 PROCEDURE — 3074F SYST BP LT 130 MM HG: CPT | Performed by: NURSE PRACTITIONER

## 2022-12-14 RX ORDER — 1.1% SODIUM FLUORIDE PRESCRIPTION DENTAL CREAM 5 MG/G
CREAM DENTAL
COMMUNITY
Start: 2022-11-11

## 2022-12-14 NOTE — TELEPHONE ENCOUNTER
Pt states there is no redness in the cyst area. States does not feel area is any warmer then the rest to the touch. States pain on 4/10 when sitting or putting pressure. States cysts is about 1 1/2 inch. Pt states is on day #4 of other symptoms but feeling better now. States home COVID tests are coming back negative but will f/u with PCP to r/o flu. Advised to monitor for worsening cyst symptoms. Advised can try doing sitz baths as well as warm compresses to the area. Advised if no improvement or worsening to let us know. Pt agrees and states understanding. Msg also to [facility] as FYI.

## 2022-12-14 NOTE — TELEPHONE ENCOUNTER
Agree with sitz baths and warm compresses. I don't think symptoms are related - she may have a URI and on top of it a bartholin - especially if cyst nontender. Advise covid test - if negative I am happy to see her in office, but also needs to be fever free.

## 2022-12-15 ENCOUNTER — OFFICE VISIT (OUTPATIENT)
Dept: OPHTHALMOLOGY | Facility: CLINIC | Age: 32
End: 2022-12-15
Payer: COMMERCIAL

## 2022-12-15 DIAGNOSIS — S00.252A FOREIGN BODY OF LEFT EYELID: Primary | ICD-10-CM

## 2022-12-15 PROCEDURE — 99203 OFFICE O/P NEW LOW 30 MIN: CPT | Performed by: OPHTHALMOLOGY

## 2022-12-15 NOTE — ASSESSMENT & PLAN NOTE
Eyelash removed in office from left upper lid without complications. No antibiotics needed. Recommend using artificial tears 4-6 times per day in the left eye for 2-3 days. Patient should call office and make return appointment if symptoms worsen or do not completely resolve.

## 2022-12-15 NOTE — PATIENT INSTRUCTIONS
Foreign body of left eyelid  Eyelash removed in office. No antibiotics needed. Recommend using artificial tears 4-6 times per day in the left eye for 2-3 days. Patient should call office and make return appointment if symptoms worsen or do not completely resolve.

## 2023-08-16 DIAGNOSIS — Z30.41 ORAL CONTRACEPTIVE USE: ICD-10-CM

## 2023-08-17 NOTE — TELEPHONE ENCOUNTER
Tasked to  to assist with setting up appt with Dr Jennifer Sultana for px/pap. Also sent [employer] message to pt for appt.     Please reply to danish: CONNIE Mccall

## 2023-08-17 NOTE — TELEPHONE ENCOUNTER
Please review. Protocol Failed or has no Protocol. [employer] message sent to pt requesting appt. Also, sent to  to assist with appt. Advise on this refill request.     Requested Prescriptions   Pending Prescriptions Disp Refills    Norgestim-Eth Estrad Triphasic (TRI-LINYAH) 0.18/0.215/0.25 MG-35 MCG Oral Tab 84 tablet 3     Sig: Take 1 tablet by mouth daily.        Gynecology Medication Protocol Failed - 8/16/2023  2:26 PM        Failed - In person appointment or virtual visit in the past 12 mos or appointment in next 3 mos     Recent Outpatient Visits              8 months ago Foreign body of left eyelid    [facility] Ades, MD    Office Visit    8 months ago Left eye pain    Nelma Eisenmenger, [place], [place], DANTE    Office Visit    1 year ago Routine health maintenance    Rojas Alonso, [address], Abhi Harris MD    Office Visit    1 year ago Vaginal itching    [facility], [address], [facility], APRNAWAF    Office Visit    1 year ago Recurrent UTI    [facility], [address], [employer], [address], APRN    Office Visit                      Failed - Pass dependent on manual look-up of last PAP and patient compliance with PAP follow up recommendations

## 2023-08-18 RX ORDER — NORGESTIMATE AND ETHINYL ESTRADIOL 7DAYSX3 28
1 KIT ORAL DAILY
Qty: 84 TABLET | Refills: 0 | Status: SHIPPED | OUTPATIENT
Start: 2023-08-18

## 2023-09-07 RX ORDER — NITROFURANTOIN MACROCRYSTALS 50 MG/1
50 CAPSULE ORAL DAILY PRN
Qty: 30 CAPSULE | Refills: 5 | OUTPATIENT
Start: 2023-09-07

## 2023-09-21 RX ORDER — NITROFURANTOIN MACROCRYSTALS 50 MG/1
50 CAPSULE ORAL DAILY PRN
Qty: 30 CAPSULE | Refills: 5 | OUTPATIENT
Start: 2023-09-21

## 2023-09-28 RX ORDER — NITROFURANTOIN MACROCRYSTALS 50 MG/1
50 CAPSULE ORAL
Qty: 30 CAPSULE | Refills: 0 | OUTPATIENT
Start: 2023-09-28

## 2023-09-28 NOTE — TELEPHONE ENCOUNTER
"                  MRN:[id number]                      After Visit Summary   8/7/2018    Ilene Gaviria    MRN: [id number]           Thank you!     Thank you for choosing [facility] for your care. Our goal is always to provide you with excellent care. Hearing back from our patients is one way we can continue to improve our services. Please take a few minutes to complete the written survey that you may receive in the mail after you visit with us. Thank you!        Patient Information     Date Of Birth          1961        Designated Caregiver       Most Recent Value    Caregiver    Will someone help with your care after discharge? yes    Name of designated caregiver Obinna    Phone number of caregiver [phone]    Caregiver address [address] [place] [address] [address], [place], [place]      About your hospital stay     You were admitted on:  August 7, 2018 You last received care in the:  [facility] [facility]    You were discharged on:  August 11, 2018        Reason for your hospital stay       Surgery.            Reason for your hospital stay       The patient was in the hospital to have surgery for rectal cancer.                  Who to Call     For medical emergencies, please call 911.  For non-urgent questions about your medical care, please call your primary care provider or clinic, [phone]  For questions related to your surgery, please call your surgery clinic        Attending Provider     Provider Specialty    Jonny Finney MD Colon and Rectal Surgery       Primary Care Provider Office Phone # Fax #    Olesya RENETTA Maza [phone]      After Care Instructions     Diet       Follow this diet upon discharge: Regular            Discharge Instructions       Follow up with Dr. Ramos in 1 weeks.            Wound care and dressings       May shower with incisions uncovered. Apply Aquaphor to gluteal incision daily. Limit sitting upright to 20\" per hour for 2 weeks. Avoid " I s/w pt and informed her that she hasn't been seen since 5/2022 and she will need an appt to get a refill. I asked if she made an appt and she stated for mond 10/2 and I told her to make sure she asks for that refill at her visit. heavy lifting or strenuous exercise for 2 weeks. Peripad prn drainage. Strip drains daily, record output daily. May use OTC ibuprofen/tylenol for discomfort.            Wound care and dressings       Instructions to care for your wound at home: daily dressing changes as needed to your abdominal JONAH drain site.                  Follow-up Appointments     Follow-up and recommended labs and tests        Follow up in the office in 3-4 weeks with Dr. Finney or at your already scheduled post op visit. Call [phone] to schedule your appointment. Call the office at [phone] if you develop fever, uncontrolled pain, bleeding, nausea, vomiting, or constipation.     No heavy lifting or straining over 15-20 lbs for 6 weeks. No Driving while taking narcotic pain medications                  Additional Services     Home care nursing referral       RN skilled nursing visit. RN to provide support for new colostomy.    Your provider has ordered home care nursing services. If you have not been contacted within 2 days of your discharge please call the inpatient department phone number at [phone] .                  Further instructions from your care team       Discharge Instructions following Abdominal Surgery  [facility]      Bowel Function  After removal of a portion of the intestines, it is normal for bowel movements to be erratic.  They are often looser and more frequent.  This condition generally resolves within a few weeks or it can be controlled with diet or medication.  Diet  In general, you may return to a well-balanced diet upon discharge.  Your doctor will let you know when to add extra fiber to your diet.  Be sure to drink plenty of fluids.  Incision  You should expect some discomfort in the area of your incision, particularly as you increase your activity.  If you notice an area of increasing redness or new drainage, please call your doctor.  You may shower as desired  but refrain from tub baths or swimming until the incisions are fully healed.  Activity  Gradually increase your activity each day.  There are generally no restrictions on walking, climbing stairs, or riding in a car.  You can usually drive a car 7-10 days after your leave the hospital.  Do not drive while taking narcotic pain medications.  Ask your doctor regarding resumption of physical sexual activity; in most cases, you can resume sex after a few weeks.  Your physician will advise you about when to return to work.  It is preferable to have somebody stay with you at home until you are fully healed and able to resume a normal level of activity   Medications  You will be discharged with a prescription pain medication and any medications you were taking prior to surgery.  Do not drive while taking narcotic pain medications.  If you need additional medications or a refill, you must call your doctor during normal business hours.  It is the policy of [facility] to not refill pain medication after hours or on weekends because your chart is not available.  Follow-up  Typically, you will be asked to schedule a follow-up office visit 1 to 4 weeks after surgery.  If you have any questions related to follow-up, medications, or your condition, please call the office.      Call your surgeon if you have these signs or symptoms:  ([phone])    Problem with the incision, including increasing pain, swelling, redness, or drainage    Increasing abdominal pain    Uncontrolled nausea or vomiting    Fever or chills    Constipation  (no bowel movement for 3 days)    Diarrhea (more than 3 watery stools within 24 hours)    Bleeding from the rectum, wound, or stoma    Any questions or concerns  You are being discharged with home care services through [facility]. [facility] will contact you to schedule initial visit. If you have any questions prior to this call, please contact the 24 hour  "patient line at [phone].    New Colostomy:   - HHC to reinforce appliance change and colostomy management  -HHC to monitor need for a convex barrier  - HHC to monitor buttocks incisions       1.Change appliance 2x/wk and prn   2. Empty or change pouch when 1/3 full of stool/gas  3. Will not harm appliance to get wet during bathing.        Blow dry (on cool setting) cloth tape and backing after bath or shower  4. Iniitally Eat 6 small meals/day. No dietary restrictions related to colostomy  5. Drink plenty of fluids  6. Always carry and emergency appliance system  7. For best results use a odor eliminator (Febreeze) in your bathroom prior to emptying/changing the appliance  8. No heavy lifting, no sit-ups but walk.  9. OK to take steps.       Supplies:  [employer] New image 2pc: 20 pouch changes/month  1. Barrier: flextend, cut to fit with tape                                       #44692  5/bx = -4bx/month  2. Pouch: Opaque lock n roll                                                    #[id number] 10/bx = 2bx/month OR  3. Pouch: Closed opaque no  filter, 9\"                                       #80159 30/bx =  2bx/month   4. Pouch: Closed opaque filter 7\"                                        #30994 60/bx = 1bx/month  4. Optional if leakage:  Adapt ring                                             #7805   10/bx = 2bx/month          Procedure for appliance change:  1. Draw and cut opening in barrier (If cut to fit)  following pattern,   2. Remove plastic backing  3. Optional if leakage:  Open ring. Stretch to approx size of opening in pouch.   4.  Optional: Place full ring around opening on sticky side of pouch. Press into place  5. Fold back edge of tape to create a \"tab\" This assists with paper removal in Step #11  6 Set barrier aside  7. Gently remove pouching system from around stoma. Discard.  8. Clean skin around stoma with disposable wipe or moist gauze. Discard.  9. Pat the skin dry  10. Center stoma in " "pouch opening. Press barrier to skin  11. Pull on \"tabs\" to remove paper that covers the tape. Press tape to skin  12. Close lock n roll closure on drainable pouch.  If using closed appliance can change up to 2x/day      Call for any ?/concerns:  Carline TIWARI (ostomy nurses) @ [facility]  (C) [phone]  (W) [phone]                                     Pending Results     No orders found from 8/5/2018 to 8/8/2018.            Admission Information     Date & Time Provider Department Dept. Phone    8/7/2018 Jonny Finney MD [facility] [address] [facility] [phone]      Your Vitals Were     Blood Pressure Pulse Temperature Respirations Height Weight    131/86 58 97.9  F (36.6  C) (Oral) 16 1.727 m (5' 8\") 91.6 kg (202 lb)    Pulse Oximetry BMI (Body Mass Index)                91% 30.71 kg/m2          Care EveryWhere ID     This is your Care EveryWhere ID. This could be used by other organizations to access your [place] medical records  [id number]        Equal Access to Services     SURESH HENAO AH: Hadii yesenia soto Sotrino, waaxda luqadaha, qaybta kaalmada adefreeman, abiodun abreu. So [facility] [phone].    ATENCIÓN: Si habla español, tiene a worley disposición servicios gratuitos de asistencia lingüística. [facility] [phone].    We comply with applicable federal civil rights laws and [place] laws. We do not discriminate on the basis of race, color, national origin, age, disability, sex, sexual orientation, or gender identity.               Review of your medicines      START taking        Dose / Directions    enoxaparin 40 MG/0.4ML injection   Commonly known as:  LOVENOX        Dose:  40 mg   Inject 0.4 mLs (40 mg) Subcutaneous every 24 hours   Quantity:  26 Syringe   Refills:  0       oxyCODONE IR 5 MG tablet   Commonly known as:  ROXICODONE        Dose:  5 mg   Take 1 tablet (5 mg) by mouth every 4 hours as needed for moderate to severe pain   Quantity:  30 " tablet   Refills:  0         CONTINUE these medicines which have NOT CHANGED        Dose / Directions    OVER-THE-COUNTER        Dose:  1 Dropperette   Take 1 Dropperette by mouth 2 times daily Hemp oil CDB (does NOT contain THC)   Refills:  0       VITAMIN C PO        Dose:  500 mg   Take 500 mg by mouth daily   Refills:  0       ZOFRAN ODT PO        Dose:  4 mg   Take 4 mg by mouth every 4 hours as needed for nausea   Refills:  0         STOP taking     FLAGYL PO           neomycin 500 MG tablet   Commonly known as:  MYCIFRADIN                Where to get your medicines      These medications were sent to [facility], [place] - [address], [address]  [address], [address], [facility] [address]     Phone:  [phone]     enoxaparin 40 MG/0.4ML injection         Some of these will need a paper prescription and others can be bought over the counter. Ask your nurse if you have questions.     Bring a paper prescription for each of these medications     oxyCODONE IR 5 MG tablet                Protect others around you: Learn how to safely use, store and throw away your medicines at www.disposemymeds.org.        Information about OPIOIDS     PRESCRIPTION OPIOIDS: WHAT YOU NEED TO KNOW   We gave you an opioid (narcotic) pain medicine. It is important to manage your pain, but opioids are not always the best choice. You should first try all the other options your care team gave you. Take this medicine for as short a time (and as few doses) as possible.    Some activities can increase your pain, such as bandage changes or therapy sessions. It may help to take your pain medicine 30 to 60 minutes before these activities. Reduce your stress by getting enough sleep, working on hobbies you enjoy and practicing relaxation or meditation. Talk to your care team about ways to manage your pain beyond prescription opioids.    These medicines have risks:    DO NOT drive when on new or higher doses of  pain medicine. These medicines can affect your alertness and reaction times, and you could be arrested for driving under the influence (DUI). If you need to use opioids long-term, talk to your care team about driving.    DO NOT operate heavy machinery    DO NOT do any other dangerous activities while taking these medicines.    DO NOT drink any alcohol while taking these medicines.     If the opioid prescribed includes acetaminophen, DO NOT take with any other medicines that contain acetaminophen. Read all labels carefully. Look for the word  acetaminophen  or  Tylenol.  Ask your pharmacist if you have questions or are unsure.    You can get addicted to pain medicines, especially if you have a history of addiction (chemical, alcohol or substance dependence). Talk to your care team about ways to reduce this risk.    All opioids tend to cause constipation. Drink plenty of water and eat foods that have a lot of fiber, such as fruits, vegetables, prune juice, apple juice and high-fiber cereal. Take a laxative (Miralax, milk of magnesia, Colace, Senna) if you don t move your bowels at least every other day. Other side effects include upset stomach, sleepiness, dizziness, throwing up, tolerance (needing more of the medicine to have the same effect), physical dependence and slowed breathing.    Store your pills in a secure place, locked if possible. We will not replace any lost or stolen medicine. If you don t finish your medicine, please throw away (dispose) as directed by your pharmacist. The [place] Pollution Control Agency has more information about safe disposal: https://www.pca.[facility].[place].us/living-green/managing-unwanted-medications             Medication List: This is a list of all your medications and when to take them. Check marks below indicate your daily home schedule. Keep this list as a reference.      Medications           Morning Afternoon Evening Bedtime As Needed    enoxaparin 40 MG/0.4ML injection    Commonly known as:  LOVENOX   Inject 0.4 mLs (40 mg) Subcutaneous every 24 hours   Last time this was given:  40 mg on 8/11/2018  7:57 AM   Next Dose Due:  8/12/18                                   OVER-THE-COUNTER   Take 1 Dropperette by mouth 2 times daily Hemp oil CDB (does NOT contain THC)                                oxyCODONE IR 5 MG tablet   Commonly known as:  ROXICODONE   Take 1 tablet (5 mg) by mouth every 4 hours as needed for moderate to severe pain   Last time this was given:  5 mg on 8/11/2018  5:46 AM                                   VITAMIN C PO   Take 500 mg by mouth daily                                ZOFRAN ODT PO   Take 4 mg by mouth every 4 hours as needed for nausea

## 2023-10-02 ENCOUNTER — OFFICE VISIT (OUTPATIENT)
Dept: SURGERY | Facility: CLINIC | Age: 33
End: 2023-10-02

## 2023-10-02 DIAGNOSIS — N39.0 RECURRENT UTI: Primary | ICD-10-CM

## 2023-10-02 PROCEDURE — 99213 OFFICE O/P EST LOW 20 MIN: CPT | Performed by: NURSE PRACTITIONER

## 2023-10-02 RX ORDER — NITROFURANTOIN MACROCRYSTALS 50 MG/1
50 CAPSULE ORAL DAILY PRN
Qty: 30 CAPSULE | Refills: 5 | Status: SHIPPED | OUTPATIENT
Start: 2023-10-02

## 2023-10-02 NOTE — PROGRESS NOTES
HPI:    Patient ID: Manny Chavira is a 28year old female. HPI     Patient is a 28year old female who presents to the clinic for a follow up. Currently on nitrofurantoin 50 mg PO post coital for UTI prevention. She feels like this works well for her. She reports 1 symptomatic UTI in the last year, she did not have any urine testing done however she states she took a couple doses of nitrofurantoin and symptoms resolved. Today she denies any urinary frequency, urgency, suprapubic pain, flank pain, dysuria, or gross hematuria. She feels this problem is stable. Review of Systems   All other systems reviewed and are negative. Current Outpatient Medications   Medication Sig Dispense Refill    nitrofurantoin macrocrystal 50 MG Oral Cap Take 1 capsule (50 mg total) by mouth daily as needed. 30 capsule 5    Norgestim-Eth Estrad Triphasic (TRI-LINYAH) 0.18/0.215/0.25 MG-35 MCG Oral Tab Take 1 tablet by mouth daily. 84 tablet 0    Amphetamine-Dextroamphet ER 15 MG Oral Capsule SR 24 Hr Take 1 capsule (15 mg total) by mouth every morning. buPROPion  MG Oral Tablet 24 Hr       SF 5000 PLUS 1.1 % Dental Cream BRUSH ONCE DAILY AT BEDTIME FOR 2 MINUTES, SPIT AND DO NOT RINSE. DO NOT EAT OR DRINK FOR 30 MINUTES AFTER USE. (Patient not taking: Reported on 10/2/2023)       Allergies:No Known Allergies    HISTORY:  History reviewed. No pertinent past medical history.    Past Surgical History:   Procedure Laterality Date    OTHER Left 06/2018    to repair broken ring finger on left hand       Family History   Problem Relation Age of Onset    Thyroid disease Mother         per NG: Hypothyroidism     Diabetes Neg     Heart Attack Neg         per NG:  Sudden death/MI under age 54      Social History:   Social History     Socioeconomic History    Marital status: Single   Tobacco Use    Smoking status: Never    Smokeless tobacco: Never   Vaping Use    Vaping Use: Never used   Substance and Sexual Activity Alcohol use: Yes     Alcohol/week: 0.0 standard drinks of alcohol     Comment: 3 drinks/week    Drug use: Yes     Types: Cannabis     Comment: Marijuana 1x every 2-3 months for the last 2 years (as of 02/13/2016)   Other Topics Concern    Caffeine Concern Yes     Comment: per NG:  Soda, 1 per week    Reaction to local anesthetic No        PHYSICAL EXAM:    Physical Exam  Constitutional:       General: She is not in acute distress. HENT:      Head: Normocephalic. Eyes:      Conjunctiva/sclera: Conjunctivae normal.   Cardiovascular:      Rate and Rhythm: Normal rate. Pulmonary:      Effort: Pulmonary effort is normal. No respiratory distress. Musculoskeletal:         General: Normal range of motion. Cervical back: Normal range of motion. Skin:     General: Skin is warm and dry. Neurological:      Mental Status: She is alert and oriented to person, place, and time. ASSESSMENT/PLAN:   Recurrent uti  (primary encounter diagnosis)      Patient is a 28year old female who presents to the clinic for a follow up. Taking nitrofurantoin 50 mg PO prn post coital for UTI prevention . She states this works well for her. RX provided. Follow up in 1 year. No orders of the defined types were placed in this encounter. Meds This Visit:  Requested Prescriptions     Signed Prescriptions Disp Refills    nitrofurantoin macrocrystal 50 MG Oral Cap 30 capsule 5     Sig: Take 1 capsule (50 mg total) by mouth daily as needed.        Imaging & Referrals:  None        #[id number]

## 2023-10-10 ENCOUNTER — OFFICE VISIT (OUTPATIENT)
Facility: CLINIC | Age: 33
End: 2023-10-10

## 2023-10-10 VITALS
SYSTOLIC BLOOD PRESSURE: 100 MMHG | HEIGHT: 67 IN | BODY MASS INDEX: 18.83 KG/M2 | RESPIRATION RATE: 16 BRPM | HEART RATE: 97 BPM | OXYGEN SATURATION: 98 % | DIASTOLIC BLOOD PRESSURE: 56 MMHG | WEIGHT: 120 LBS

## 2023-10-10 DIAGNOSIS — Z00.00 ROUTINE HEALTH MAINTENANCE: Primary | ICD-10-CM

## 2023-10-10 DIAGNOSIS — Z30.41 ORAL CONTRACEPTIVE USE: ICD-10-CM

## 2023-10-10 DIAGNOSIS — Z12.4 SCREENING FOR CERVICAL CANCER: ICD-10-CM

## 2023-10-10 PROCEDURE — 99395 PREV VISIT EST AGE 18-39: CPT | Performed by: INTERNAL MEDICINE

## 2023-10-10 PROCEDURE — 90686 IIV4 VACC NO PRSV 0.5 ML IM: CPT | Performed by: INTERNAL MEDICINE

## 2023-10-10 PROCEDURE — 3074F SYST BP LT 130 MM HG: CPT | Performed by: INTERNAL MEDICINE

## 2023-10-10 PROCEDURE — 90471 IMMUNIZATION ADMIN: CPT | Performed by: INTERNAL MEDICINE

## 2023-10-10 PROCEDURE — 3008F BODY MASS INDEX DOCD: CPT | Performed by: INTERNAL MEDICINE

## 2023-10-10 PROCEDURE — 3078F DIAST BP <80 MM HG: CPT | Performed by: INTERNAL MEDICINE

## 2023-10-10 RX ORDER — NORGESTIMATE AND ETHINYL ESTRADIOL 7DAYSX3 28
1 KIT ORAL DAILY
Qty: 84 TABLET | Refills: 3 | Status: SHIPPED | OUTPATIENT
Start: 2023-10-10

## 2023-10-10 NOTE — PATIENT INSTRUCTIONS
My last day will  be January 11, 2024.   I recommend that following providers:    [facility] ([facility] Medicine) [phone]  Dr. Zaira Pfeiffer

## 2023-10-11 LAB — HPV I/H RISK 1 DNA SPEC QL NAA+PROBE: NEGATIVE

## 2023-10-12 LAB
LAST PAP RESULT: NORMAL
PAP HISTORY (OTHER THAN LAST PAP): NORMAL

## 2024-06-19 ENCOUNTER — PATIENT MESSAGE (OUTPATIENT)
Dept: INTERNAL MEDICINE CLINIC | Facility: CLINIC | Age: 34
End: 2024-06-19

## 2024-06-20 NOTE — TELEPHONE ENCOUNTER
[employer] message sent to patient.  Last visit 10-10-23 with Dr Buckley.     No future appointments.

## 2024-10-11 ENCOUNTER — LAB ENCOUNTER (OUTPATIENT)
Dept: LAB | Age: 34
End: 2024-10-11
Attending: INTERNAL MEDICINE
Payer: COMMERCIAL

## 2024-10-11 ENCOUNTER — OFFICE VISIT (OUTPATIENT)
Dept: INTERNAL MEDICINE CLINIC | Facility: CLINIC | Age: 34
End: 2024-10-11

## 2024-10-11 VITALS
RESPIRATION RATE: 20 BRPM | DIASTOLIC BLOOD PRESSURE: 70 MMHG | HEIGHT: 67 IN | WEIGHT: 121 LBS | HEART RATE: 89 BPM | BODY MASS INDEX: 18.99 KG/M2 | SYSTOLIC BLOOD PRESSURE: 109 MMHG

## 2024-10-11 DIAGNOSIS — Z00.00 ANNUAL PHYSICAL EXAM: Primary | ICD-10-CM

## 2024-10-11 DIAGNOSIS — Z30.41 ORAL CONTRACEPTIVE USE: ICD-10-CM

## 2024-10-11 DIAGNOSIS — Z00.00 ANNUAL PHYSICAL EXAM: ICD-10-CM

## 2024-10-11 DIAGNOSIS — Z23 NEED FOR VACCINATION: ICD-10-CM

## 2024-10-11 LAB
ALBUMIN SERPL-MCNC: 4.3 G/DL (ref 3.2–4.8)
ALBUMIN/GLOB SERPL: 1.7 {RATIO} (ref 1–2)
ALP LIVER SERPL-CCNC: 45 U/L
ALT SERPL-CCNC: 12 U/L
ANION GAP SERPL CALC-SCNC: 7 MMOL/L (ref 0–18)
AST SERPL-CCNC: 20 U/L (ref ?–34)
BASOPHILS # BLD AUTO: 0.06 X10(3) UL (ref 0–0.2)
BASOPHILS NFR BLD AUTO: 1.4 %
BILIRUB SERPL-MCNC: 0.7 MG/DL (ref 0.3–1.2)
BUN BLD-MCNC: 5 MG/DL (ref 9–23)
BUN/CREAT SERPL: 6.3 (ref 10–20)
CALCIUM BLD-MCNC: 9 MG/DL (ref 8.7–10.4)
CHLORIDE SERPL-SCNC: 108 MMOL/L (ref 98–112)
CHOLEST SERPL-MCNC: 171 MG/DL (ref ?–200)
CO2 SERPL-SCNC: 27 MMOL/L (ref 21–32)
CREAT BLD-MCNC: 0.79 MG/DL
DEPRECATED RDW RBC AUTO: 40 FL (ref 35.1–46.3)
EGFRCR SERPLBLD CKD-EPI 2021: 101 ML/MIN/1.73M2 (ref 60–?)
EOSINOPHIL # BLD AUTO: 0.1 X10(3) UL (ref 0–0.7)
EOSINOPHIL NFR BLD AUTO: 2.4 %
ERYTHROCYTE [DISTWIDTH] IN BLOOD BY AUTOMATED COUNT: 11.8 % (ref 11–15)
FASTING PATIENT LIPID ANSWER: YES
FASTING STATUS PATIENT QL REPORTED: YES
GLOBULIN PLAS-MCNC: 2.5 G/DL (ref 2–3.5)
GLUCOSE BLD-MCNC: 84 MG/DL (ref 70–99)
HCT VFR BLD AUTO: 38.2 %
HDLC SERPL-MCNC: 61 MG/DL (ref 40–59)
HGB BLD-MCNC: 12.5 G/DL
IMM GRANULOCYTES # BLD AUTO: 0.02 X10(3) UL (ref 0–1)
IMM GRANULOCYTES NFR BLD: 0.5 %
LDLC SERPL CALC-MCNC: 98 MG/DL (ref ?–100)
LYMPHOCYTES # BLD AUTO: 1.5 X10(3) UL (ref 1–4)
LYMPHOCYTES NFR BLD AUTO: 35.8 %
MCH RBC QN AUTO: 30.3 PG (ref 26–34)
MCHC RBC AUTO-ENTMCNC: 32.7 G/DL (ref 31–37)
MCV RBC AUTO: 92.7 FL
MONOCYTES # BLD AUTO: 0.28 X10(3) UL (ref 0.1–1)
MONOCYTES NFR BLD AUTO: 6.7 %
NEUTROPHILS # BLD AUTO: 2.23 X10 (3) UL (ref 1.5–7.7)
NEUTROPHILS # BLD AUTO: 2.23 X10(3) UL (ref 1.5–7.7)
NEUTROPHILS NFR BLD AUTO: 53.2 %
NONHDLC SERPL-MCNC: 110 MG/DL (ref ?–130)
OSMOLALITY SERPL CALC.SUM OF ELEC: 290 MOSM/KG (ref 275–295)
PLATELET # BLD AUTO: 258 10(3)UL (ref 150–450)
POTASSIUM SERPL-SCNC: 4.2 MMOL/L (ref 3.5–5.1)
PROT SERPL-MCNC: 6.8 G/DL (ref 5.7–8.2)
RBC # BLD AUTO: 4.12 X10(6)UL
SODIUM SERPL-SCNC: 142 MMOL/L (ref 136–145)
TRIGL SERPL-MCNC: 60 MG/DL (ref 30–149)
TSI SER-ACNC: 2.06 MIU/ML (ref 0.55–4.78)
VLDLC SERPL CALC-MCNC: 10 MG/DL (ref 0–30)
WBC # BLD AUTO: 4.2 X10(3) UL (ref 4–11)

## 2024-10-11 PROCEDURE — 80061 LIPID PANEL: CPT

## 2024-10-11 PROCEDURE — 80053 COMPREHEN METABOLIC PANEL: CPT

## 2024-10-11 PROCEDURE — 36415 COLL VENOUS BLD VENIPUNCTURE: CPT

## 2024-10-11 PROCEDURE — 85025 COMPLETE CBC W/AUTO DIFF WBC: CPT

## 2024-10-11 PROCEDURE — 84443 ASSAY THYROID STIM HORMONE: CPT

## 2024-10-11 RX ORDER — NITROFURANTOIN MACROCRYSTALS 50 MG/1
50 CAPSULE ORAL DAILY PRN
Qty: 30 CAPSULE | Refills: 5 | Status: SHIPPED | OUTPATIENT
Start: 2024-10-11

## 2024-10-11 RX ORDER — NORGESTIMATE AND ETHINYL ESTRADIOL 7DAYSX3 28
1 KIT ORAL DAILY
Qty: 84 TABLET | Refills: 3 | Status: SHIPPED | OUTPATIENT
Start: 2024-10-11

## 2024-10-11 RX ORDER — DEXTROAMPHETAMINE SACCHARATE, AMPHETAMINE ASPARTATE, DEXTROAMPHETAMINE SULFATE AND AMPHETAMINE SULFATE 1.875; 1.875; 1.875; 1.875 MG/1; MG/1; MG/1; MG/1
1 TABLET ORAL 2 TIMES DAILY
COMMUNITY
Start: 2024-08-20

## 2024-10-11 NOTE — PROGRESS NOTES
Mariangel Shannon is a 33 year old female.  Chief Complaint   Patient presents with    Physical     HPI:    Patient presented today annual physical. She states that she has been doing well over all.    She has a lipoma on her back which she will be getting removed. Made an appointment with a dermatologist.    Takes nitrofurantoin as needed for recurrent utis. No other complains.     Current Outpatient Medications   Medication Sig Dispense Refill    amphetamine-dextroamphetamine 7.5 MG Oral Tab Take 1 tablet (7.5 mg total) by mouth 2 (two) times daily.      Norgestim-Eth Estrad Triphasic (TRI-LINYAH) 0.18/0.215/0.25 MG-35 MCG Oral Tab Take 1 tablet by mouth daily. 84 tablet 3    nitrofurantoin macrocrystal 50 MG Oral Cap Take 1 capsule (50 mg total) by mouth daily as needed. 30 capsule 5    buPROPion  MG Oral Tablet 24 Hr       SF 5000 PLUS 1.1 % Dental Cream  (Patient not taking: Reported on 10/11/2024)      Amphetamine-Dextroamphet ER 15 MG Oral Capsule SR 24 Hr Take 1 capsule (15 mg total) by mouth every morning. (Patient not taking: Reported on 10/11/2024)        History reviewed. No pertinent past medical history.   Past Surgical History:   Procedure Laterality Date    Other Left 06/2018    to repair broken ring finger on left hand       Social History:  Social History     Socioeconomic History    Marital status: Single   Tobacco Use    Smoking status: Never    Smokeless tobacco: Never   Vaping Use    Vaping status: Never Used   Substance and Sexual Activity    Alcohol use: Yes     Alcohol/week: 0.0 standard drinks of alcohol     Comment: 3 drinks/week    Drug use: Yes     Types: Cannabis     Comment: Marijuana 1x every 2-3 months for the last 2 years (as of 02/13/2016)    Sexual activity: Yes     Partners: Male     Birth control/protection: Pill   Other Topics Concern    Caffeine Concern Yes     Comment: per NG:  Soda, 1 per week    Reaction to local anesthetic No      Family History   Problem Relation Age of  Onset    Thyroid disease Mother         per NG: Hypothyroidism     Diabetes Neg     Heart Attack Neg         per NG:  Sudden death/MI under age 55      Allergies[1]     REVIEW OF SYSTEMS:   Review of Systems   Review of Systems   Constitutional: Negative for activity change, appetite change and fever.   HENT: Negative for congestion and voice change.    Respiratory: Negative for cough and shortness of breath.    Cardiovascular: Negative for chest pain.   Gastrointestinal: Negative for abdominal distention, abdominal pain and vomiting.   Genitourinary: Negative for hematuria.   Skin: Negative for wound.   Psychiatric/Behavioral: Negative for behavioral problems.   Wt Readings from Last 5 Encounters:   10/11/24 121 lb (54.9 kg)   10/10/23 120 lb (54.4 kg)   12/14/22 120 lb (54.4 kg)   08/15/22 122 lb (55.3 kg)   05/18/22 125 lb (56.7 kg)     Body mass index is 18.95 kg/m².      EXAM:   /70   Pulse 89   Resp 20   Ht 5' 7\" (1.702 m)   Wt 121 lb (54.9 kg)   LMP 10/07/2024 (Exact Date)   BMI 18.95 kg/m²   Physical Exam   Constitutional:       Appearance: Normal appearance.   HENT:      Head: Normocephalic.   Eyes:      Conjunctiva/sclera: Conjunctivae normal.   Cardiovascular:      Rate and Rhythm: Normal rate and regular rhythm.      Heart sounds: Normal heart sounds. No murmur heard.  Pulmonary:      Effort: Pulmonary effort is normal.      Breath sounds: Normal breath sounds. No rhonchi or rales.   Abdominal:      General: Bowel sounds are normal.      Palpations: Abdomen is soft.      Tenderness: There is no abdominal tenderness.   Musculoskeletal:      Cervical back: Neck supple.      Right lower leg: No edema.      Left lower leg: No edema.   Skin:     General: Skin is warm and dry.   Neurological:      General: No focal deficit present.      Mental Status: He is alert and oriented to person, place, and time. Mental status is at baseline.   Psychiatric:         Mood and Affect: Mood normal.          Behavior: Behavior normal.       ASSESSMENT AND PLAN:   1. Annual physical exam  - check annual fasting labs  - immunizations reviewed. Flu shot to be given today   - Comp Metabolic Panel (14); Future  - Lipid Panel; Future  - TSH W Reflex To Free T4; Future  - CBC With Differential With Platelet; Future    2. Oral contraceptive use  - Norgestim-Eth Estrad Triphasic (TRI-LINYAH) 0.18/0.215/0.25 MG-35 MCG Oral Tab; Take 1 tablet by mouth daily.  Dispense: 84 tablet; Refill: 3    3- macrobid also refilled, to be used as needed. Follow up with urology       The patient indicates understanding of these issues and agrees to the plan.  Follow up in 1 year for annual physical     Joanne Rodriguez MD        [1] No Known Allergies

## 2024-10-30 ENCOUNTER — PATIENT MESSAGE (OUTPATIENT)
Dept: INTERNAL MEDICINE CLINIC | Facility: CLINIC | Age: 34
End: 2024-10-30

## 2024-11-27 ENCOUNTER — NURSE TRIAGE (OUTPATIENT)
Dept: INTERNAL MEDICINE CLINIC | Facility: CLINIC | Age: 34
End: 2024-11-27

## 2024-11-27 NOTE — TELEPHONE ENCOUNTER
Action Requested: Summary for Provider     []  Critical Lab, Recommendations Needed  [] Need Additional Advice  []   FYI    []   Need Orders  [] Need Medications Sent to Pharmacy  []  Other     SUMMARY: Patient was scheduled for office visit to evaluate small bump in the left groin near hip.  Noticed it today.  Feels it under the skin.  There is a smaller bump as well.  Not painful.   Advised if it worsens, go to [facility]mediate Care for evaluation over this holiday weekend.  Otherwise, keep appointment.    Future Appointments   Date Time Provider Department Center   12/2/2024  2:00 PM [id number]       Reason for call: Acute  Bumps under the skin  Onset: Patient noticed small bump about 1 cm in groin near left hip.  Not sure how long it was there.  Last week she saw Derm and had a cyst removed from lower back.    Also mentions she normally has a period every 28 days, but has had light bleeding since 11/23 which is 10 days early.  Thinks she forgot to take the first pill on schedule.  No new stressors or illnesses.  Light amount of bleeding, denies abdominal cramping or pain.  Advised to call if worsens or go to ER if she develops heavy bleeding/soaking a pad every hour.        Reason for Disposition   Patient wants to be seen    Protocols used: Skin Lump or Localized Swelling-A-OH

## 2024-12-02 ENCOUNTER — TELEPHONE (OUTPATIENT)
Dept: INTERNAL MEDICINE CLINIC | Facility: CLINIC | Age: 34
End: 2024-12-02

## 2024-12-02 ENCOUNTER — LAB ENCOUNTER (OUTPATIENT)
Dept: LAB | Age: 34
End: 2024-12-02
Attending: NURSE PRACTITIONER
Payer: COMMERCIAL

## 2024-12-02 ENCOUNTER — OFFICE VISIT (OUTPATIENT)
Dept: INTERNAL MEDICINE CLINIC | Facility: CLINIC | Age: 34
End: 2024-12-02
Payer: COMMERCIAL

## 2024-12-02 VITALS
BODY MASS INDEX: 19 KG/M2 | WEIGHT: 121.13 LBS | SYSTOLIC BLOOD PRESSURE: 108 MMHG | HEART RATE: 94 BPM | RESPIRATION RATE: 16 BRPM | DIASTOLIC BLOOD PRESSURE: 73 MMHG

## 2024-12-02 DIAGNOSIS — R59.0 INGUINAL LYMPHADENOPATHY: ICD-10-CM

## 2024-12-02 DIAGNOSIS — N92.6 IRREGULAR UTERINE BLEEDING: Primary | ICD-10-CM

## 2024-12-02 DIAGNOSIS — N92.6 IRREGULAR UTERINE BLEEDING: ICD-10-CM

## 2024-12-02 LAB
BASOPHILS # BLD AUTO: 0.04 X10(3) UL (ref 0–0.2)
BASOPHILS NFR BLD AUTO: 0.6 %
DEPRECATED RDW RBC AUTO: 38.7 FL (ref 35.1–46.3)
EOSINOPHIL # BLD AUTO: 0.03 X10(3) UL (ref 0–0.7)
EOSINOPHIL NFR BLD AUTO: 0.5 %
ERYTHROCYTE [DISTWIDTH] IN BLOOD BY AUTOMATED COUNT: 11.6 % (ref 11–15)
HCT VFR BLD AUTO: 36.8 %
HGB BLD-MCNC: 12.6 G/DL
IMM GRANULOCYTES # BLD AUTO: 0.01 X10(3) UL (ref 0–1)
IMM GRANULOCYTES NFR BLD: 0.2 %
LYMPHOCYTES # BLD AUTO: 2.02 X10(3) UL (ref 1–4)
LYMPHOCYTES NFR BLD AUTO: 31.7 %
MCH RBC QN AUTO: 31.3 PG (ref 26–34)
MCHC RBC AUTO-ENTMCNC: 34.2 G/DL (ref 31–37)
MCV RBC AUTO: 91.3 FL
MONOCYTES # BLD AUTO: 0.41 X10(3) UL (ref 0.1–1)
MONOCYTES NFR BLD AUTO: 6.4 %
NEUTROPHILS # BLD AUTO: 3.86 X10 (3) UL (ref 1.5–7.7)
NEUTROPHILS # BLD AUTO: 3.86 X10(3) UL (ref 1.5–7.7)
NEUTROPHILS NFR BLD AUTO: 60.6 %
PLATELET # BLD AUTO: 279 10(3)UL (ref 150–450)
RBC # BLD AUTO: 4.03 X10(6)UL
TSI SER-ACNC: 2.25 UIU/ML (ref 0.55–4.78)
WBC # BLD AUTO: 6.4 X10(3) UL (ref 4–11)

## 2024-12-02 PROCEDURE — 85025 COMPLETE CBC W/AUTO DIFF WBC: CPT

## 2024-12-02 PROCEDURE — 3074F SYST BP LT 130 MM HG: CPT | Performed by: NURSE PRACTITIONER

## 2024-12-02 PROCEDURE — 99213 OFFICE O/P EST LOW 20 MIN: CPT | Performed by: NURSE PRACTITIONER

## 2024-12-02 PROCEDURE — 84443 ASSAY THYROID STIM HORMONE: CPT

## 2024-12-02 PROCEDURE — 36415 COLL VENOUS BLD VENIPUNCTURE: CPT

## 2024-12-02 PROCEDURE — 3078F DIAST BP <80 MM HG: CPT | Performed by: NURSE PRACTITIONER

## 2024-12-02 NOTE — TELEPHONE ENCOUNTER
Patient calling ( name and date of birth of patient verified ) has an appointment   For next week but her bleeding has been increasing asking to be seen today if possible;huber    Previous appointment  canceled    New appointment made         Future Appointments   Date Time Provider Department Center   12/2/2024  3:40 PM Blanca Kate, [id number] [place]

## 2024-12-02 NOTE — PROGRESS NOTES
Mariangel Shannon is a 34 year old female.  HPI:   Pt reports some breakthrough bleeding on the OCP. Reports she skipped a day recently of her pills. LMP 11/05/2024. Noticed light spotting 11/23/2024, now tapering down. She also reports she noticed lymph node in groin left side is firm and swollen. Denies any recent illness or stressors. Sexually active, no new partners (male), no concern for STI. Denies any vaginal discharge, pelvic pain, fever, chills, urinary symptoms, blood in urine.   Current Outpatient Medications   Medication Sig Dispense Refill    amphetamine-dextroamphetamine 7.5 MG Oral Tab Take 1 tablet (7.5 mg total) by mouth 2 (two) times daily.      Norgestim-Eth Estrad Triphasic (TRI-LINYAH) 0.18/0.215/0.25 MG-35 MCG Oral Tab Take 1 tablet by mouth daily. 84 tablet 3    nitrofurantoin macrocrystal 50 MG Oral Cap Take 1 capsule (50 mg total) by mouth daily as needed. 30 capsule 5    SF 5000 PLUS 1.1 % Dental Cream       Amphetamine-Dextroamphet ER 15 MG Oral Capsule SR 24 Hr Take 1 capsule (15 mg total) by mouth every morning.      buPROPion  MG Oral Tablet 24 Hr         History reviewed. No pertinent past medical history.   Social History:  Social History     Socioeconomic History    Marital status: Single   Tobacco Use    Smoking status: Never    Smokeless tobacco: Never   Vaping Use    Vaping status: Never Used   Substance and Sexual Activity    Alcohol use: Yes     Alcohol/week: 0.0 standard drinks of alcohol     Comment: 3 drinks/week    Drug use: Yes     Types: Cannabis     Comment: Marijuana 1x every 2-3 months for the last 2 years (as of 02/13/2016)    Sexual activity: Yes     Partners: Male     Birth control/protection: Pill   Other Topics Concern    Caffeine Concern Yes     Comment: per NG:  Soda, 1 per week    Reaction to local anesthetic No        REVIEW OF SYSTEMS:   Review of Systems   All other systems reviewed and are negative.         EXAM:   /73 (BP Location: Left arm, Patient  Position: Sitting, Cuff Size: adult)   Pulse 94   Resp 16   Wt 121 lb 1.6 oz (54.9 kg)   LMP 12/02/2024 (Exact Date)   BMI 18.97 kg/m²     Physical Exam  Vitals reviewed.   Constitutional:       General: She is not in acute distress.  HENT:      Head: Normocephalic.   Eyes:      General: No scleral icterus.     Conjunctiva/sclera: Conjunctivae normal.   Abdominal:      General: Abdomen is flat. Bowel sounds are normal. There is no distension.      Palpations: Abdomen is soft. There is no mass.      Tenderness: There is no abdominal tenderness.   Genitourinary:     Comments: deferred  Musculoskeletal:      Cervical back: Normal range of motion and neck supple. No tenderness.   Lymphadenopathy:      Cervical: No cervical adenopathy.      Upper Body:      Right upper body: No supraclavicular, axillary, pectoral or epitrochlear adenopathy.      Left upper body: No supraclavicular, axillary, pectoral or epitrochlear adenopathy.      Lower Body: Left inguinal adenopathy present.      Comments: Left groin, firm 1 cm ovoid mass   Skin:     General: Skin is warm.      Coloration: Skin is not jaundiced.   Neurological:      Mental Status: She is alert and oriented to person, place, and time.            ASSESSMENT AND PLAN:   1. Irregular uterine bleeding  -light spotting/now tapering. Advised to monitor for resolution, monitor next month's cycle. Any new or worsening sx, will need pelvic US and gyne referral. Reviewed alarm signs/when to go to ER.   -will check thyroid labs.   - TSH W Reflex To Free T4 [E]; Future    2. Inguinal lymphadenopathy  -US groin and CBC ordered.  -Pending results, consider General Surgery referral.   -tylenol 500 mg BID x5 days  - z [facility] US GROIN BILATERAL LIMITED (CPT=76882); Future  - CBC W Differential W Platelet [E]; Future  - z [facility] US GROIN BILATERAL LIMITED (CPT=76882)       The patient indicates understanding of these issues and agrees to the plan.  The patient is asked to return  in 1 week.     The above note was creating using Dragon speech recognition technology. Please excuse any typos.

## (undated) DIAGNOSIS — R30.0 DYSURIA: Primary | ICD-10-CM